# Patient Record
Sex: FEMALE | Race: WHITE | NOT HISPANIC OR LATINO | Employment: FULL TIME | URBAN - METROPOLITAN AREA
[De-identification: names, ages, dates, MRNs, and addresses within clinical notes are randomized per-mention and may not be internally consistent; named-entity substitution may affect disease eponyms.]

---

## 2024-06-04 ENCOUNTER — APPOINTMENT (OUTPATIENT)
Dept: GENERAL RADIOLOGY | Facility: CLINIC | Age: 54
End: 2024-06-04
Attending: EMERGENCY MEDICINE
Payer: COMMERCIAL

## 2024-06-04 ENCOUNTER — HOSPITAL ENCOUNTER (EMERGENCY)
Facility: CLINIC | Age: 54
Discharge: HOME | End: 2024-06-04
Attending: FAMILY MEDICINE | Admitting: FAMILY MEDICINE
Payer: COMMERCIAL

## 2024-06-04 VITALS
RESPIRATION RATE: 16 BRPM | TEMPERATURE: 98.4 F | SYSTOLIC BLOOD PRESSURE: 179 MMHG | OXYGEN SATURATION: 97 % | HEART RATE: 78 BPM | DIASTOLIC BLOOD PRESSURE: 112 MMHG

## 2024-06-04 DIAGNOSIS — R07.89 CHEST TIGHTNESS: ICD-10-CM

## 2024-06-04 DIAGNOSIS — R60.0 BILATERAL LOWER EXTREMITY EDEMA: ICD-10-CM

## 2024-06-04 DIAGNOSIS — R03.0 ELEVATED BLOOD PRESSURE READING WITHOUT DIAGNOSIS OF HYPERTENSION: ICD-10-CM

## 2024-06-04 LAB
ALBUMIN SERPL BCG-MCNC: 4.5 G/DL (ref 3.5–5.2)
ALP SERPL-CCNC: 60 U/L (ref 40–150)
ALT SERPL W P-5'-P-CCNC: 47 U/L (ref 0–50)
ANION GAP SERPL CALCULATED.3IONS-SCNC: 16 MMOL/L (ref 7–15)
AST SERPL W P-5'-P-CCNC: 34 U/L (ref 0–45)
BASOPHILS # BLD AUTO: 0 10E3/UL (ref 0–0.2)
BASOPHILS NFR BLD AUTO: 1 %
BILIRUB SERPL-MCNC: 0.3 MG/DL
BUN SERPL-MCNC: 16.6 MG/DL (ref 6–20)
CALCIUM SERPL-MCNC: 9.7 MG/DL (ref 8.6–10)
CHLORIDE SERPL-SCNC: 105 MMOL/L (ref 98–107)
CREAT SERPL-MCNC: 0.63 MG/DL (ref 0.51–0.95)
D DIMER PPP FEU-MCNC: 0.42 UG/ML FEU (ref 0–0.5)
DEPRECATED HCO3 PLAS-SCNC: 21 MMOL/L (ref 22–29)
EGFRCR SERPLBLD CKD-EPI 2021: >90 ML/MIN/1.73M2
EOSINOPHIL # BLD AUTO: 0.1 10E3/UL (ref 0–0.7)
EOSINOPHIL NFR BLD AUTO: 2 %
ERYTHROCYTE [DISTWIDTH] IN BLOOD BY AUTOMATED COUNT: 11.4 % (ref 10–15)
GLUCOSE SERPL-MCNC: 94 MG/DL (ref 70–99)
HCT VFR BLD AUTO: 41.6 % (ref 35–47)
HGB BLD-MCNC: 15.3 G/DL (ref 11.7–15.7)
IMM GRANULOCYTES # BLD: 0 10E3/UL
IMM GRANULOCYTES NFR BLD: 0 %
LIPASE SERPL-CCNC: 32 U/L (ref 13–60)
LYMPHOCYTES # BLD AUTO: 2.8 10E3/UL (ref 0.8–5.3)
LYMPHOCYTES NFR BLD AUTO: 35 %
MCH RBC QN AUTO: 32.2 PG (ref 26.5–33)
MCHC RBC AUTO-ENTMCNC: 36.8 G/DL (ref 31.5–36.5)
MCV RBC AUTO: 88 FL (ref 78–100)
MONOCYTES # BLD AUTO: 0.5 10E3/UL (ref 0–1.3)
MONOCYTES NFR BLD AUTO: 6 %
NEUTROPHILS # BLD AUTO: 4.4 10E3/UL (ref 1.6–8.3)
NEUTROPHILS NFR BLD AUTO: 56 %
NRBC # BLD AUTO: 0 10E3/UL
NRBC BLD AUTO-RTO: 0 /100
NT-PROBNP SERPL-MCNC: 171 PG/ML (ref 0–900)
PLATELET # BLD AUTO: 144 10E3/UL (ref 150–450)
POTASSIUM SERPL-SCNC: 3.7 MMOL/L (ref 3.4–5.3)
PROT SERPL-MCNC: 7.3 G/DL (ref 6.4–8.3)
RBC # BLD AUTO: 4.75 10E6/UL (ref 3.8–5.2)
SODIUM SERPL-SCNC: 142 MMOL/L (ref 135–145)
TROPONIN T SERPL HS-MCNC: <6 NG/L
WBC # BLD AUTO: 7.9 10E3/UL (ref 4–11)

## 2024-06-04 PROCEDURE — 99285 EMERGENCY DEPT VISIT HI MDM: CPT | Mod: 25

## 2024-06-04 PROCEDURE — 93308 TTE F-UP OR LMTD: CPT | Mod: 26 | Performed by: EMERGENCY MEDICINE

## 2024-06-04 PROCEDURE — 85379 FIBRIN DEGRADATION QUANT: CPT | Performed by: EMERGENCY MEDICINE

## 2024-06-04 PROCEDURE — 83690 ASSAY OF LIPASE: CPT | Performed by: FAMILY MEDICINE

## 2024-06-04 PROCEDURE — 93005 ELECTROCARDIOGRAM TRACING: CPT

## 2024-06-04 PROCEDURE — 85025 COMPLETE CBC W/AUTO DIFF WBC: CPT | Performed by: FAMILY MEDICINE

## 2024-06-04 PROCEDURE — 80053 COMPREHEN METABOLIC PANEL: CPT | Performed by: FAMILY MEDICINE

## 2024-06-04 PROCEDURE — 71046 X-RAY EXAM CHEST 2 VIEWS: CPT

## 2024-06-04 PROCEDURE — 83880 ASSAY OF NATRIURETIC PEPTIDE: CPT | Performed by: EMERGENCY MEDICINE

## 2024-06-04 PROCEDURE — 93010 ELECTROCARDIOGRAM REPORT: CPT | Performed by: EMERGENCY MEDICINE

## 2024-06-04 PROCEDURE — 84484 ASSAY OF TROPONIN QUANT: CPT | Performed by: FAMILY MEDICINE

## 2024-06-04 PROCEDURE — 99284 EMERGENCY DEPT VISIT MOD MDM: CPT | Mod: 25 | Performed by: EMERGENCY MEDICINE

## 2024-06-04 PROCEDURE — 93308 TTE F-UP OR LMTD: CPT

## 2024-06-04 PROCEDURE — 36415 COLL VENOUS BLD VENIPUNCTURE: CPT | Performed by: FAMILY MEDICINE

## 2024-06-04 RX ORDER — PROGESTERONE 100 MG/1
100 CAPSULE ORAL DAILY
COMMUNITY

## 2024-06-04 RX ORDER — ESTRADIOL 1 MG/1
1 TABLET ORAL DAILY
COMMUNITY

## 2024-06-04 ASSESSMENT — ACTIVITIES OF DAILY LIVING (ADL)
ADLS_ACUITY_SCORE: 35
ADLS_ACUITY_SCORE: 35

## 2024-06-05 NOTE — ED TRIAGE NOTES
Upper chest pressure/tightness & SOB, pain started a couple days ago while driving, waxes and wanes but hasn't really gone away. No significant cardiac hx, feeling SOB as well but thinks that is r/t her lower back pain. Also c/o bilateral lower leg swelling     Triage Assessment (Adult)       Row Name 06/04/24 2035          Triage Assessment    Airway WDL WDL        Respiratory WDL    Respiratory WDL X;all     Rhythm/Pattern, Respiratory unlabored;shortness of breath     Expansion/Accessory Muscles/Retractions no use of accessory muscles;no retractions     Nailbeds no discoloration     Mucous Membranes moist     Cough Frequency no cough        Skin Circulation/Temperature WDL    Skin Circulation/Temperature WDL WDL        Cardiac WDL    Cardiac WDL X;all        Chest Pain Assessment    Chest Pain Location midsternal     Associated Signs/Symptoms dyspnea     Chest Pain Intervention 12-lead ECG obtained        Peripheral/Neurovascular WDL    Peripheral Neurovascular WDL WDL        Cognitive/Neuro/Behavioral WDL    Cognitive/Neuro/Behavioral WDL WDL

## 2024-06-05 NOTE — DISCHARGE INSTRUCTIONS
Your evaluation in the ED was overall reassuring.     Your blood pressure was high. This could be playing a part in your shortness of breath and lower extremity swelling. No signs of fluid in your lungs. EKG was reassuring. Chest Xray was clear.     It will be important that you follow up with your primary care provider and have blood pressure checked. Would also suggest an echocardiogram.     If your symptoms worsen or you develop new or concerning symptoms, please return to the Emergency Department for further evaluation and treatment.

## 2024-06-05 NOTE — ED PROVIDER NOTES
History     Chief Complaint   Patient presents with    Chest Pain     HPI  Danna Suarez is a 53 year old female with history of perimenopausal symptoms, fatigue, anxiety, obesity, who presents for evaluation of several months of bilateral lower extremity edema, exertional dyspnea and now with occasional chest tightness over the past 2 to 3 days.  Chest tightness is not exertional but says is triggered by stressful situations at work.  No nausea, vomiting or diaphoresis.  No history of coronary disease.  No tobacco use.  No known history of hypertension, diabetes, kidney disease.    The patient's PMHx, Surgical Hx, Allergies, and Medications were all reviewed with the patient.    Allergies:  No Known Allergies    Problem List:    Patient Active Problem List    Diagnosis Date Noted    Perimenopausal symptoms 10/06/2016     Priority: Medium    Chronic fatigue 08/24/2016     Priority: Medium    Generalized anxiety disorder 08/24/2016     Priority: Medium    Tinea versicolor 03/18/2013     Priority: Medium    Obesity (BMI 30.0-34.9) 07/20/2010     Priority: Medium        Past Medical History:    No past medical history on file.    Past Surgical History:    No past surgical history on file.    Family History:    No family history on file.    Social History:  Marital Status:          Medications:    estradiol (ESTRACE) 1 MG tablet  progesterone (PROMETRIUM) 100 MG capsule          Review of Systems  Pertinent positives and negatives mentioned in HPI    Physical Exam   BP: (!) 191/95  Pulse: 95  Temp: 98.4  F (36.9  C)  Resp: 20  SpO2: 96 %    GEN: Awake, alert, and cooperative. No acute distress.  CV : Regular rate and rhythm.  No murmurs appreciated  PULM: Normal effort. No wheezes, rales, or rhonchi bilaterally.  ABD: Soft, non-tender, non-distended. No rebound or guarding.   NEURO: Normal speech. Following commands. Answering questions and interacting appropriately.   EXT: 2+ bilateral lower extremity edema to  knee  INT: Warm. No diaphoresis. Normal color.        ED Course        Procedures  Results for orders placed during the hospital encounter of 06/04/24    POC US ECHO LIMITED    Impression  [facility] Procedure Note    Limited Bedside ED Cardiac Ultrasound:    PROCEDURE: PERFORMED BY: Dr. Francois Sahni MD  INDICATIONS/SYMPTOM:  Chest Pain and Shortness of Breath  PROBE: Cardiac phased array probe  BODY LOCATION: Chest  FINDINGS:  The ultrasound was performed utilizing the subcostal, parasternal long axis, parasternal short axis, and apical 4 chamber views.  Cardiac contractility:  Present  Gross estimation of cardiac kinesis: normal  Pericardial Effusion:  None  RV:LV ratio: LV > RV  IVC: not visualized.    INTERPRETATION:    Chamber size and motion were grossly normal with LV > RV, normal cardiac kinesis.  No pericardial effusion was found.    IMAGE DOCUMENTATION: Images were archived to PACs system.         EKG: Interpreted by Francois Sahni MD this rhythm with rate of 78 bpm.  Normal axis.  Normal R wave progression.  Normal intervals.  No ST segment elevations or depressions.  No pathologic Q waves.  No abnormal T wave inversions.  No prior EKG for comparison.  Impression sinus rhythm with no evidence of acute ischemia.       Critical Care time:  none               Results for orders placed or performed during the hospital encounter of 06/04/24 (from the past 24 hour(s))   CBC with platelets differential    Narrative    The following orders were created for panel order CBC with platelets differential.  Procedure                               Abnormality         Status                     ---------                               -----------         ------                     CBC with platelets and d...[[id number]]  Abnormal            Final result               RBC and Platelet Morphology[[id number]]                                                   Please view results for these tests on the  individual orders.   Comprehensive metabolic panel   Result Value Ref Range    Sodium 142 135 - 145 mmol/L    Potassium 3.7 3.4 - 5.3 mmol/L    Carbon Dioxide (CO2) 21 (L) 22 - 29 mmol/L    Anion Gap 16 (H) 7 - 15 mmol/L    Urea Nitrogen 16.6 6.0 - 20.0 mg/dL    Creatinine 0.63 0.51 - 0.95 mg/dL    GFR Estimate >90 >60 mL/min/1.73m2    Calcium 9.7 8.6 - 10.0 mg/dL    Chloride 105 98 - 107 mmol/L    Glucose 94 70 - 99 mg/dL    Alkaline Phosphatase 60 40 - 150 U/L    AST 34 0 - 45 U/L    ALT 47 0 - 50 U/L    Protein Total 7.3 6.4 - 8.3 g/dL    Albumin 4.5 3.5 - 5.2 g/dL    Bilirubin Total 0.3 <=1.2 mg/dL   Lipase   Result Value Ref Range    Lipase 32 13 - 60 U/L   Troponin T, High Sensitivity   Result Value Ref Range    Troponin T, High Sensitivity <6 <=14 ng/L   POC US ECHO LIMITED    [facility] Procedure Note      Limited Bedside ED Cardiac Ultrasound:    PROCEDURE: PERFORMED BY: Dr. Francois Sahni MD  INDICATIONS/SYMPTOM:  Chest Pain and Shortness of Breath  PROBE: Cardiac phased array probe  BODY LOCATION: Chest  FINDINGS:   The ultrasound was performed utilizing the subcostal, parasternal long axis, parasternal short axis, and apical 4 chamber views.  Cardiac contractility:  Present  Gross estimation of cardiac kinesis: normal  Pericardial Effusion:  None  RV:LV ratio: LV > RV  IVC: not visualized.    INTERPRETATION:    Chamber size and motion were grossly normal with LV > RV, normal cardiac kinesis.  No pericardial effusion was found.      IMAGE DOCUMENTATION: Images were archived to PACs system.         CBC with platelets and differential   Result Value Ref Range    WBC Count 7.9 4.0 - 11.0 10e3/uL    RBC Count 4.75 3.80 - 5.20 10e6/uL    Hemoglobin 15.3 11.7 - 15.7 g/dL    Hematocrit 41.6 35.0 - 47.0 %    MCV 88 78 - 100 fL    MCH 32.2 26.5 - 33.0 pg    MCHC 36.8 (H) 31.5 - 36.5 g/dL    RDW 11.4 10.0 - 15.0 %    Platelet Count 144 (L) 150 - 450 10e3/uL    % Neutrophils 56 %    %  Lymphocytes 35 %    % Monocytes 6 %    % Eosinophils 2 %    % Basophils 1 %    % Immature Granulocytes 0 %    NRBCs per 100 WBC 0 <1 /100    Absolute Neutrophils 4.4 1.6 - 8.3 10e3/uL    Absolute Lymphocytes 2.8 0.8 - 5.3 10e3/uL    Absolute Monocytes 0.5 0.0 - 1.3 10e3/uL    Absolute Eosinophils 0.1 0.0 - 0.7 10e3/uL    Absolute Basophils 0.0 0.0 - 0.2 10e3/uL    Absolute Immature Granulocytes 0.0 <=0.4 10e3/uL    Absolute NRBCs 0.0 10e3/uL   D dimer quantitative   Result Value Ref Range    D-Dimer Quantitative 0.42 0.00 - 0.50 ug/mL FEU    Narrative    This D-dimer assay is intended for use in conjunction with a clinical pretest probability assessment model to exclude pulmonary embolism (PE) and deep venous thrombosis (DVT) in outpatients suspected of PE or DVT. The cut-off value is 0.50 ug/mL FEU.    For patients 50 years of age or older, the application of age-adjusted cut-off values for D-Dimer may increase the specificity without significant effect on sensitivity. The literature suggested calculation age adjusted cut-off in ug/L = age in years x 10 ug/L. The results in this laboratory are reported as ug/mL rather than ug/L. The calculation for age adjusted cut off in ug/mL= age in years x 0.01 ug/mL. For example, the cut off for a 76 year old male is 76 x 0.01 ug/mL = 0.76 ug/mL (760 ug/L).    M Marysol et al. Age adjusted D-dimer cut-off levels to rule out pulmonary embolism: The ADJUST-PE Study. MAURICIO 2014;311:9144-8444.; HJ Shayy et al. Diagnostic accuracy of conventional or age adjusted D-dimer cutoff values in older patients with suspected venous thromboembolism. Systemic review and meta-analysis. BMJ 2013:346:f2492.       Medications - No data to display    Assessments & Plan (with Medical Decision Making)   53 year old female with cardiac risk factors of obesity with several months of exertional dyspnea, lower extremity edema and now with 2 days of intermittent chest tightness detailed in HPI.  On  exam she is well-appearing.  Blood pressure is elevated 179/112.  Does not carry diagnosis of hypertension.  States that last time she saw her doctor the blood pressure was slightly elevated but not started on hypertensive medications.  Bilateral lower extremity edema.  EKG without evidence of acute ischemia.  Point-of-care ultrasound with no obvious wall motion abnormality pericardial effusion.  No signs RV dilatation however windows are limited.    High-sensitivity troponin less than 6 and nonischemic ECG making ACS very unlikely.  Nontender abdomen.  Normal lipase, unlikely pancreatitis.  Unlikely intra-abdominal pathology based on exam.  D-dimer is not elevated and no further workup for PE necessary.  No clinical signs of DVT.  CBC without leukocytosis or anemia.  Chest x-ray without acute infiltrate, effusion or pneumothorax by my interpretation.  Does have mild ankle swelling.  NT proBNP is not elevated.  Blood pressure however was elevated but does not have diagnosis of hypertension.  Will need close follow-up with her primary care provider and consideration of formal echocardiogram, close blood pressure monitoring as I suspect that she does have hypertension which is likely part of her symptoms.  No signs of acute endorgan dysfunction at this time.  No strain pattern EKG, shortness of breath, elevated troponin, elevated creatinine and no signs of encephalopathy.  She is comfortable being discharged home and will follow-up as we discussed.  ED return precautions also discussed.       I have reviewed the nursing notes.         New Prescriptions    No medications on file       Final diagnoses:   Elevated blood pressure reading without diagnosis of hypertension   Chest tightness   Bilateral lower extremity edema     Francois Sahni MD        6/4/2024   [facility] EMERGENCY DEPT    Disclaimer: This note consists of words and symbols derived from keyboarding and dictation using voice recognition  software.  As a result, there may be errors that have gone undetected.  Please consider this when interpreting information found in this note.               Francois Sahni MD  06/07/24 1812